# Patient Record
Sex: FEMALE | Race: BLACK OR AFRICAN AMERICAN | Employment: FULL TIME | URBAN - METROPOLITAN AREA
[De-identification: names, ages, dates, MRNs, and addresses within clinical notes are randomized per-mention and may not be internally consistent; named-entity substitution may affect disease eponyms.]

---

## 2018-11-14 ENCOUNTER — HOSPITAL ENCOUNTER (EMERGENCY)
Age: 33
Discharge: HOME | End: 2018-11-14
Attending: EMERGENCY MEDICINE | Admitting: EMERGENCY MEDICINE
Payer: COMMERCIAL

## 2018-11-14 VITALS
RESPIRATION RATE: 14 BRPM | DIASTOLIC BLOOD PRESSURE: 67 MMHG | TEMPERATURE: 99.6 F | HEART RATE: 99 BPM | WEIGHT: 130 LBS | BODY MASS INDEX: 21.66 KG/M2 | HEIGHT: 65 IN | SYSTOLIC BLOOD PRESSURE: 107 MMHG | OXYGEN SATURATION: 99 %

## 2018-11-14 DIAGNOSIS — K08.89 PAIN, DENTAL: Primary | ICD-10-CM

## 2018-11-14 DIAGNOSIS — Z72.0 TOBACCO ABUSE: ICD-10-CM

## 2018-11-14 DIAGNOSIS — K02.9 DENTAL CARIES: ICD-10-CM

## 2018-11-14 PROCEDURE — 74011250637 HC RX REV CODE- 250/637: Performed by: NURSE PRACTITIONER

## 2018-11-14 PROCEDURE — 99283 EMERGENCY DEPT VISIT LOW MDM: CPT

## 2018-11-14 RX ORDER — CHLORHEXIDINE GLUCONATE 1.2 MG/ML
15 RINSE ORAL 2 TIMES DAILY
Qty: 420 ML | Refills: 0 | Status: SHIPPED | OUTPATIENT
Start: 2018-11-14 | End: 2018-11-28

## 2018-11-14 RX ORDER — ONDANSETRON 4 MG/1
4 TABLET, ORALLY DISINTEGRATING ORAL
Status: COMPLETED | OUTPATIENT
Start: 2018-11-14 | End: 2018-11-14

## 2018-11-14 RX ORDER — IBUPROFEN 400 MG/1
800 TABLET ORAL
Status: COMPLETED | OUTPATIENT
Start: 2018-11-14 | End: 2018-11-14

## 2018-11-14 RX ORDER — OXYCODONE AND ACETAMINOPHEN 5; 325 MG/1; MG/1
2 TABLET ORAL
Status: COMPLETED | OUTPATIENT
Start: 2018-11-14 | End: 2018-11-14

## 2018-11-14 RX ADMIN — IBUPROFEN 800 MG: 400 TABLET ORAL at 20:11

## 2018-11-14 RX ADMIN — OXYCODONE HYDROCHLORIDE AND ACETAMINOPHEN 2 TABLET: 5; 325 TABLET ORAL at 20:11

## 2018-11-14 RX ADMIN — ONDANSETRON 4 MG: 4 TABLET, ORALLY DISINTEGRATING ORAL at 20:11

## 2018-11-14 NOTE — LETTER
[facility] EMERGENCY DEPT 
[address] [facility] [address] [phone] 
[phone] Work/School Note Date: 11/14/2018 To Whom It May concern: 
 
Edith Pain was seen and treated today in the emergency room by the following provider(s): 
Attending Provider: Dorita Del Rio MD 
Nurse Practitioner: Carey Avitia NP. Edith Pain may return to work on 11/17/2018. Sincerely, Trinity Jeffery, NP

## 2018-11-15 NOTE — DISCHARGE INSTRUCTIONS
[employer] Activation    Thank you for requesting access to [employer]. Please follow the instructions below to securely access and download your online medical record. [employer] allows you to send messages to your doctor, view your test results, renew your prescriptions, schedule appointments, and more. How Do I Sign Up? 1. In your internet browser, go to [email or web address]  2. Click on the First Time User? Click Here link in the Sign In box. You will be redirect to the New Member Sign Up page. 3. Enter your [employer] Access Code exactly as it appears below. You will not need to use this code after youve completed the sign-up process. If you do not sign up before the expiration date, you must request a new code. [employer] Access Code: [email or web address]  Expires: 2019  7:49 PM (This is the date your [employer] access code will )    4. Enter the last four digits of your Social Security Number (xxxx) and Date of Birth (mm/dd/yyyy) as indicated and click Submit. You will be taken to the next sign-up page. 5. Create a [employer] ID. This will be your [employer] login ID and cannot be changed, so think of one that is secure and easy to remember. 6. Create a [employer] password. You can change your password at any time. 7. Enter your Password Reset Question and Answer. This can be used at a later time if you forget your password. 8. Enter your e-mail address. You will receive e-mail notification when new information is available in [address]. 9. Click Sign Up. You can now view and download portions of your medical record. 10. Click the Download Summary menu link to download a portable copy of your medical information. Additional Information    If you have questions, please visit the Frequently Asked Questions section of the [employer] website at [email or web address] Remember, [employer] is NOT to be used for urgent needs. For medical emergencies, dial 911.

## 2018-11-15 NOTE — ED PROVIDER NOTES
EMERGENCY DEPARTMENT HISTORY AND PHYSICAL EXAM 
 
7:54 PM 
 
 
Date: 11/14/2018 Patient Name: Lizzie Luna History of Presenting Illness Chief Complaint Patient presents with  Dental Pain  Ear Pain History Provided By: Patient Chief Complaint: dental pain, jaw pain Duration:  Days Timing:  Acute Location: dental 
Quality: Aching Severity: Moderate Modifying Factors: she has been taking OTC excedrin and old percocet with minimal relief. Associated Symptoms: denies any other associated signs or symptoms Additional History (Context): Lizzie Luna is a 28 y.o. female arrived to ER c/o dental pain radiating to left ear x3 days. She verbalizes has dental sensitivity with cold beverages. She reports has been taking OTC Excedrin and took her last dose of Percocet with minimal relief. She reports smokes 1/2 pack of cigarettes per day. Tolerating po fluids and solids well. Denies fever, chills, facial swelling, headache, dizziness, Cp,SOB,or any other concerns. PCP: Roseline Desai., DO 
 
Current Facility-Administered Medications Medication Dose Route Frequency Provider Last Rate Last Dose  oxyCODONE-acetaminophen (PERCOCET) 5-325 mg per tablet 2 Tab  2 Tab Oral NOW Mary Saldaña NP      
 ondansetron (ZOFRAN ODT) tablet 4 mg  4 mg Oral NOW Mary Saldaña NP      
 ibuprofen (MOTRIN) tablet 800 mg  800 mg Oral NOW Parrish Saldaña NP Current Outpatient Medications Medication Sig Dispense Refill  chlorhexidine (PERIDEX) 0.12 % solution 15 mL by Swish and Spit route two (2) times a day for 14 days. 420 mL 0 Past History Past Medical History: 
History reviewed. No pertinent past medical history. Past Surgical History: 
Past Surgical History:  
Procedure Laterality Date  HX HERNIA REPAIR Family History: 
History reviewed. No pertinent family history. Social History: 
Social History Tobacco Use  
  Smoking status: Current Every Day Smoker Substance Use Topics  Alcohol use: Yes  Drug use: No  
 
 
Allergies: 
No Known Allergies Review of Systems Review of Systems Constitutional: Negative for activity change, appetite change, chills, diaphoresis, fatigue, fever and unexpected weight change. HENT: Positive for dental problem. Respiratory: Negative for cough and shortness of breath. Cardiovascular: Negative for chest pain. Gastrointestinal: Negative for abdominal pain, nausea and vomiting. Skin: Negative for rash. Neurological: Negative for dizziness, weakness and headaches. Hematological: Negative. Negative for adenopathy. Does not bruise/bleed easily. Psychiatric/Behavioral: Negative for sleep disturbance and suicidal ideas. All other systems reviewed and are negative. Physical Exam  
 
Visit Vitals /67 (BP 1 Location: Right arm, BP Patient Position: Sitting) Pulse 99 Temp 99.6 °F (37.6 °C) Resp 14 Ht 5' 5\" (1.651 m) Wt 59 kg (130 lb) SpO2 99% BMI 21.63 kg/m² Physical Exam  
Constitutional: She is oriented to person, place, and time. She appears well-developed and well-nourished. No distress. HENT:  
Right Ear: Hearing, tympanic membrane, external ear and ear canal normal.  
Left Ear: Hearing, tympanic membrane, external ear and ear canal normal.  
Nose: Nose normal.  
Mouth/Throat: Oropharynx is clear and moist and mucous membranes are normal. No oral lesions. No trismus in the jaw. Abnormal dentition (multiple ). Dental caries (multiple) present. No dental abscesses or uvula swelling. No oropharyngeal exudate. Cardiovascular: Normal rate, regular rhythm and normal heart sounds. Exam reveals no gallop and no friction rub. No murmur heard. Pulmonary/Chest: Effort normal and breath sounds normal. No respiratory distress. She has no wheezes. She has no rales. She exhibits no tenderness. Musculoskeletal: Normal range of motion. Neurological: She is alert and oriented to person, place, and time. Skin: Skin is warm and dry. She is not diaphoretic. Psychiatric: She has a normal mood and affect. Her speech is normal and behavior is normal. Judgment and thought content normal. Cognition and memory are normal.  
Nursing note and vitals reviewed. Diagnostic Study Results Labs -none No results found for this or any previous visit (from the past 12 hour(s)). Radiologic Studies - none No orders to display Medical Decision Making I am the first provider for this patient. I reviewed the vital signs, available nursing notes, past medical history, past surgical history, family history and social history. Vital Signs-Reviewed the patient's vital signs. Records Reviewed: Old Medical Records (Time of Review: 7:54 PM) 
 
ED Course: Progress Notes, Reevaluation, and Consults: 
 
Provider Notes (Medical Decision Making): DDX: Periapical abscess, Trigeminal neuralgia,  space infection, David's angina, Retropharyngeal space infection, Infection after root canal, Dental caries, Odontalgia, Dry Socket, Acute Necrotizing Ulcerative Gingivitis (ANUG). Clinical Impression/plan:  Patient stable condition. Will prescribe Peridex. May take OTC Motrin as directed. Will refer to dentist.  Follow up with PCP in 2-4 days. If symptoms worsen or have any other concerns, return to ER. Patient verbalizes d/c instructions. Diagnosis Clinical Impression: 1. Pain, dental   
2. Dental caries 3. Tobacco abuse Disposition: home Follow-up Information Follow up With Specialties Details Why Contact Info [facility]  Schedule an appointment as soon as possible for a visit in 2 days  [address] [facility] [address] [address] [facility]) [address] [phone]  Annmarie Das  Schedule an appointment as soon as possible for a visit in 2 days  [address] [address] 
[phone] Return to ER immediately for any worsening symptoms or concerns. Medication List  
  
START taking these medications   
chlorhexidine 0.12 % solution Commonly known as:  PERIDEX 15 mL by Swish and Spit route two (2) times a day for 14 days. Where to Get Your Medications Information about where to get these medications is not yet available Ask your nurse or doctor about these medications · chlorhexidine 0.12 % solution 
  
 
_______________________________ Scribe Attestation Blane Chacon NP acting as a scribe for and in the presence of Zoe Ivory MD     
November 14, 2018 at 7:54 PM 
    
Provider Attestation:     
I personally performed the services described in the documentation, reviewed the documentation, as recorded by the scribe in my presence, and it accurately and completely records my words and actions. November 14, 2018 at 7:54 PM - Zoe Ivory MD   
 
 
_______________________________